# Patient Record
Sex: FEMALE | Race: WHITE | Employment: PART TIME | URBAN - NONMETROPOLITAN AREA
[De-identification: names, ages, dates, MRNs, and addresses within clinical notes are randomized per-mention and may not be internally consistent; named-entity substitution may affect disease eponyms.]

---

## 2024-02-09 ENCOUNTER — OFFICE VISIT (OUTPATIENT)
Dept: SURGERY | Age: 69
End: 2024-02-09
Payer: MEDICARE

## 2024-02-09 VITALS
RESPIRATION RATE: 16 BRPM | OXYGEN SATURATION: 95 % | HEART RATE: 71 BPM | WEIGHT: 156 LBS | SYSTOLIC BLOOD PRESSURE: 138 MMHG | DIASTOLIC BLOOD PRESSURE: 86 MMHG | HEIGHT: 70 IN | BODY MASS INDEX: 22.33 KG/M2

## 2024-02-09 DIAGNOSIS — C44.01 BASAL CELL CARCINOMA (BCC) OF SKIN OF LEFT UPPER LIP: Primary | ICD-10-CM

## 2024-02-09 PROCEDURE — 1123F ACP DISCUSS/DSCN MKR DOCD: CPT | Performed by: PLASTIC SURGERY

## 2024-02-09 PROCEDURE — 99204 OFFICE O/P NEW MOD 45 MIN: CPT | Performed by: PLASTIC SURGERY

## 2024-02-09 RX ORDER — CEPHALEXIN 500 MG/1
500 CAPSULE ORAL 2 TIMES DAILY
COMMUNITY
Start: 2024-02-05

## 2024-02-09 RX ORDER — AMLODIPINE BESYLATE 2.5 MG/1
1 TABLET ORAL EVERY MORNING
COMMUNITY
Start: 2023-12-14

## 2024-02-09 ASSESSMENT — ENCOUNTER SYMPTOMS: RESPIRATORY NEGATIVE: 1

## 2024-02-09 NOTE — PROGRESS NOTES
New Patient Lesion    68 year old white female presents today reporting a lesion of the left upper lip.  Patient states this lesion has been present for several years.  The lesion has been unchanged since first noticed.  There has been some bleeding from the area.  Patient has been seen by a dermatologist in the past. Prior treatment includes excision twice, once several years ago (pt guessing 2008) and most recently on Monday, 2/5/24.  Patient recalls that she does use sunscreen regularly and avoids tanning beds.  Patient states natural hair color is reddish-brown and has light complexion.  Patient is currently taking no blood thinners.  Patient is not a tobacco smoker.  I have reviewed the patient's medical history in detail and updated the computerized patient record.

## 2024-02-09 NOTE — PROGRESS NOTES
Subjective:      Patient ID: Sera Stinson is a 68 y.o. female.    HPI  Patient presents today reporting a lesion of the left upper lip.  Patient states this lesion has been present for several years.  The lesion has been unchanged since first noticed.  There has been some bleeding from the area.  Patient has been seen by a dermatologist in the past. Prior treatment includes excision twice, once several years ago (pt guessing 2008) and most recently on Monday, 2/5/24. Currently taking Keflex 500 BID. Patient recalls that she does use sunscreen regularly and avoids tanning beds.  Patient states natural hair color is reddish-brown and has light complexion.  Patient is currently taking no blood thinners.  Patient is not a tobacco smoker .    Review of Systems   Constitutional: Negative.    HENT: Negative.     Respiratory: Negative.     Cardiovascular: Negative.    Musculoskeletal: Negative.    Neurological: Negative.    Hematological:         H/O ITP, \"mild\"       Objective:   Physical Exam  Vitals and nursing note reviewed. Exam conducted with a chaperone present.   Constitutional:       Appearance: Normal appearance.   HENT:      Head: Normocephalic and atraumatic.        Comments:     2.5 x 2.2 cm defect left upper lip post Mohs resection.  Clean. No infection.  Path was BCC.     Mouth/Throat:      Mouth: Mucous membranes are moist.   Eyes:      Extraocular Movements: Extraocular movements intact.      Conjunctiva/sclera: Conjunctivae normal.      Pupils: Pupils are equal, round, and reactive to light.   Pulmonary:      Effort: Pulmonary effort is normal.   Skin:     General: Skin is warm and dry.   Neurological:      General: No focal deficit present.      Mental Status: She is alert and oriented to person, place, and time.         Assessment:      BCC left upper lip.      Plan:      I discussed with her a cheek advancement flap. Diagrammed for her to show where scars will be. Left cheek will be tight, somewhat

## 2024-02-13 NOTE — PROGRESS NOTES
PAT phone call for local completed with pt.    Date/time/location (entrance C) of surgery/procedure verified with pt.    Instructed pt to take a shower the AM of surgery/procedure and to avoid lotions, creams, jewelry.    Encouraged pt to avoid artificial nails and/or nailpolish.    Instructed pt to take BP meds with a small sip of water prior to procedure/surgery.

## 2024-02-14 ENCOUNTER — HOSPITAL ENCOUNTER (OUTPATIENT)
Age: 69
Setting detail: OUTPATIENT SURGERY
Discharge: HOME | End: 2024-02-14
Attending: PLASTIC SURGERY | Admitting: PLASTIC SURGERY
Payer: MEDICARE

## 2024-02-14 VITALS
HEART RATE: 60 BPM | TEMPERATURE: 98.4 F | WEIGHT: 155.4 LBS | DIASTOLIC BLOOD PRESSURE: 89 MMHG | SYSTOLIC BLOOD PRESSURE: 150 MMHG | OXYGEN SATURATION: 97 % | BODY MASS INDEX: 23.02 KG/M2 | RESPIRATION RATE: 19 BRPM | HEIGHT: 69 IN

## 2024-02-14 DIAGNOSIS — G89.18 POST-OP PAIN: Primary | ICD-10-CM

## 2024-02-14 DIAGNOSIS — C44.01 BASAL CELL CARCINOMA OF LIP: ICD-10-CM

## 2024-02-14 PROCEDURE — 2500000003 HC RX 250 WO HCPCS: Performed by: PLASTIC SURGERY

## 2024-02-14 PROCEDURE — 2709999900 HC NON-CHARGEABLE SUPPLY: Performed by: PLASTIC SURGERY

## 2024-02-14 PROCEDURE — 3600000012 HC SURGERY LEVEL 2 ADDTL 15MIN: Performed by: PLASTIC SURGERY

## 2024-02-14 PROCEDURE — 3600000002 HC SURGERY LEVEL 2 BASE: Performed by: PLASTIC SURGERY

## 2024-02-14 PROCEDURE — 7100000010 HC PHASE II RECOVERY - FIRST 15 MIN: Performed by: PLASTIC SURGERY

## 2024-02-14 PROCEDURE — 88305 TISSUE EXAM BY PATHOLOGIST: CPT

## 2024-02-14 RX ORDER — HYDROCODONE BITARTRATE AND ACETAMINOPHEN 5; 325 MG/1; MG/1
1 TABLET ORAL EVERY 4 HOURS PRN
Qty: 30 TABLET | Refills: 0 | Status: SHIPPED | OUTPATIENT
Start: 2024-02-14 | End: 2024-02-19

## 2024-02-14 ASSESSMENT — PAIN - FUNCTIONAL ASSESSMENT: PAIN_FUNCTIONAL_ASSESSMENT: 0-10

## 2024-02-14 NOTE — BRIEF OP NOTE
Brief Postoperative Note      Patient: Sera Stinson  YOB: 1955  MRN: [id number]    Date of Procedure: 2/14/2024    Pre-Op Diagnosis Codes:     * Basal cell carcinoma of lip [C44.01]    Post-Op Diagnosis: Same       Procedure(s):  RE-EXCISION UPPER LIP MOHS DEFECT WITH FLAP REPAIR    Surgeon(s):  Luna Hopkins MD    Assistant:  * No surgical staff found *    Anesthesia: Local    Estimated Blood Loss (mL): Minimal    Complications: None    Specimens:   ID Type Source Tests Collected by Time Destination   A : RE EXCISION OF MOHS DEFECT UPPER LIP-*STITCH AT 12 O CLOCK Tissue Tissue SURGICAL PATHOLOGY Luna Hopkins MD 2/14/2024 1502        Implants:  * No implants in log *      Drains: * No LDAs found *    Findings:       Electronically signed by Luna Hopkins MD on 2/14/2024 at 4:20 PM

## 2024-02-14 NOTE — H&P
Subjective:      Patient ID: Sera Stinson is a 68 y.o. female.     HPI  Patient presents today due to a BCC of the left upper lip, S/P Mohs resection. Here for reconstruction.     Review of Systems   Constitutional: Negative.    HENT: Negative.     Respiratory: Negative.     Cardiovascular: Negative.    Musculoskeletal: Negative.    Neurological: Negative.    Hematological:         H/O ITP, \"mild\"      Medical History    Medical History    Diagnosis Date Comment Source   Alpha-1-antitrypsin deficiency (HCC)      Hypothyroidism         Other Medical History    Diagnosis Date Comment Source   H/O idiopathic thrombocytopenic purpura 1992 bone marrow biopsy    Hyperlipidemia      Hypertension      Idiopathic thrombocytopenia purpura (HCC)      Osteopenia      Skin nodule  clavicle left         Family History  Pedigree   Problem Relation Age of Onset Comments   COPD Father     Cancer Maternal Aunt  breast ca   Cancer Maternal Cousin  breast ca   Heart Disease Father  cardiomyopathy     Social History    Tobacco History    Smoking Status   Never   Smokeless Tobacco Use   Never     Alcohol History    Alcohol Use Status   Yes Comment   wine     Drug Use    Drug Use Status   No     Sexual Activity    Sexually Active   Not Asked    Surgical History    Procedure Laterality Date Comment Source   COLONOSCOPY  03/05/2010 totuous sig and left colon    HERNIA REPAIR  01/01/1962     HYSTERECTOMY (CERVIX STATUS UNKNOWN)       HYSTERECTOMY, VAGINAL  08/31/2015 Dr Joseph; with suspenion; patient states BSO also    LAPAROTOMY   ovarian cysts      E-cigarette/Vaping    Questions Responses   E-cigarette/Vaping Use    Start Date    Passive Exposure    Quit Date    Counseling Given    Comments      Socioeconomic History    Employment History    No employment history on file.     Family and Education    Marital Status        Social Identity    Preferred Language Ethnicity Race   English Non- / Non  White (non-)  those delineated on the consent form for the planned procedure and the anesthesia methods. All questions were answered to patient's satisfaction. Consent was reviewed and signed.

## 2024-02-14 NOTE — DISCHARGE INSTRUCTIONS
You have had lesions removed today under local anesthesia.    You may have some pain at the surgery site after the procedure.     You should avoid aspirin products and over the counter products  for 3 days.     Keep areas clean and dry.     No strenuous activity for 24  HOURS     No swimming, no tub baths,no hot tubs    Eat lightly at first, advance diet as tolerated. Drink plenty of fluids.    Keep it covered with a sterile bandage until follow up appointment     Steri-strips  will fall off on their own in about a week.    You may shower 24 hours after the procedure.    Pat the wound dry after you have washed it with a mild soap.    Do not submerge the wound in water until it is well-healed.      Take pain medication as directed, if necessary per instructions.     Complete ALL antibiotics as directed for entire duration of therapy.     Stitches will be left in the skin until your follow up appointment.     Call Your Doctor if any of the following occurs:     Signs of infection, including fever and chills   Redness, swelling, increasing pain, excessive bleeding, or discharge from the incision site   Pain that you cannot control with the medicines you have been given   Any new symptoms

## 2024-02-15 NOTE — OP NOTE
[facility] [id number] [place] [address]                                OPERATIVE REPORT    PATIENT NAME: CARMEN ALDANA                    :        1955  MED REC NO:   [id number]                             ROOM:  ACCOUNT NO:   [id number]                           ADMIT DATE: 2024  PROVIDER:     Luna Hopkins    DATE OF PROCEDURE:  2024    ATTENDING PROVIDER AND SURGEON:  Luna Hopkins MD    PREOPERATIVE DIAGNOSIS:  Mohs defect from basal cell carcinoma excision  of left upper lip.    POSTOPERATIVE DIAGNOSIS:  Mohs defect from basal cell carcinoma excision  of left upper lip.    PROCEDURE PERFORMED:  Re-excision of Mohs defect upper lip with  reconstruction of the lip utilizing a cheek advancement flap (total area  40 cm2).    ANESTHESIA:  Local 1% Xylocaine With Epinephrine, buffered.    INDICATIONS:  The patient is a 68-year-old female who underwent Mohs  resection just over a week ago resulting in a large defect comprising of  almost a third of the upper lip.  She presents at this time now for  reconstruction of the defect.  The procedure, the risks, the benefits  were discussed with her.  All questions were answered to her  satisfaction.  Consent was signed.    NARRATIVE SUMMARY:  The patient was brought to the operating suite,  placed in the supine position.  She was prepped and draped in the usual  sterile fashion.  Initially using a marker, outline was made to take a  margin all the way around the Mohs defect and get back to a clean  surgical wound.  Next, initial design was made for the cheek advancement  flap.  Local anesthetic was then infiltrated into the area.    Attention was first taken to the re-excision of the defect.  Scalpel was  used to carry out the incision all the way around the margin.  Note that  it was suture tagged at the 12 o'clock position.  Then with a scalpel,  the specimen was

## 2024-02-16 LAB — SURGICAL PATHOLOGY REPORT: NORMAL

## 2024-03-22 ENCOUNTER — OFFICE VISIT (OUTPATIENT)
Dept: SURGERY | Age: 69
End: 2024-03-22

## 2024-03-22 VITALS
BODY MASS INDEX: 22.66 KG/M2 | RESPIRATION RATE: 12 BRPM | OXYGEN SATURATION: 98 % | HEIGHT: 69 IN | WEIGHT: 153 LBS | DIASTOLIC BLOOD PRESSURE: 66 MMHG | SYSTOLIC BLOOD PRESSURE: 114 MMHG | HEART RATE: 65 BPM

## 2024-03-22 DIAGNOSIS — C44.01 BASAL CELL CARCINOMA (BCC) OF SKIN OF LEFT UPPER LIP: Primary | ICD-10-CM

## 2024-03-22 PROCEDURE — 99024 POSTOP FOLLOW-UP VISIT: CPT | Performed by: PLASTIC SURGERY

## 2024-03-22 NOTE — PROGRESS NOTES
Post Op Follow Up    69 year old white female presents today for a post-op follow up of re-excision upper lip MOHS defect with flap repair which was completed on 2/14/24. Incision is flat, and free of excess redness, swelling, or heat.  Area free of drainage or bleeding.  Patient denies pain. I have reviewed the patient's medical history in detail and updated the computerized patient record.

## 2024-03-22 NOTE — PROGRESS NOTES
Subjective:      Patient ID: Sera Stinson is a 69 y.o. female.    HPI  Patient presents today for a post-op follow up of re-excision upper lip MOHS defect with flap repair which was completed on 2/14/24. Incision is flat, and free of excess redness, swelling, or heat.  Area free of drainage or bleeding.  Patient denies pain .    Review of Systems    Objective:   Physical Exam  HENT:      Head:      Comments:     Flap is blending well with contour.nasolabial fold reforming.  Scar still red, getting softer.  No more pulling on cheek.          Assessment:      Flap reconstruction lip Mohs defect.      Plan:      Discussed massage to soften scar.  Discussed sunscreens.  Recheck in 6 months.          Luna Hopkins MD

## (undated) DEVICE — SOLUTION SCRB 4OZ 7.5% POVIDONE IOD ANTIMIC BTL

## (undated) DEVICE — SYRINGE, LUER LOCK, 10ML: Brand: MEDLINE

## (undated) DEVICE — MHPB HEAD AND NECK  PACK: Brand: MEDLINE INDUSTRIES, INC.

## (undated) DEVICE — INTENDED FOR TISSUE SEPARATION, AND OTHER PROCEDURES THAT REQUIRE A SHARP SURGICAL BLADE TO PUNCTURE OR CUT.: Brand: BARD-PARKER ® CARBON RIB-BACK BLADES

## (undated) DEVICE — SOLUTION IRRIG 1000ML 0.9% SOD CHL USP POUR PLAS BTL

## (undated) DEVICE — SUTURE ABSORBABLE BRAIDED 4-0 P-3 18 IN UD VICRYL J494G

## (undated) DEVICE — SUTURE VCRL + SZ 4-0 L18IN ABSRB UD P-3 3/8 CIR REV CUT NDL VCP494H

## (undated) DEVICE — STRIP,CLOSURE,WOUND,MEDI-STRIP,1/2X4: Brand: MEDLINE

## (undated) DEVICE — GLOVE ORANGE PI 7 1/2   MSG9075

## (undated) DEVICE — SUTURE PROL SZ 4-0 L18IN NONABSORBABLE BLU L16MM PC-3 3/8 8634G

## (undated) DEVICE — PREMIUM DRY TRAY LF: Brand: MEDLINE INDUSTRIES, INC.